# Patient Record
Sex: MALE | Race: ASIAN | NOT HISPANIC OR LATINO | URBAN - METROPOLITAN AREA
[De-identification: names, ages, dates, MRNs, and addresses within clinical notes are randomized per-mention and may not be internally consistent; named-entity substitution may affect disease eponyms.]

---

## 2017-01-05 ENCOUNTER — SURGERY (OUTPATIENT)
Dept: URBAN - METROPOLITAN AREA SURGERY 5 | Facility: SURGERY | Age: 77
End: 2017-01-05
Payer: MEDICARE

## 2017-01-05 PROCEDURE — 66821 AFTER CATARACT LASER SURGERY: CPT | Performed by: OPHTHALMOLOGY

## 2017-03-14 ENCOUNTER — FOLLOW UP ESTABLISHED (OUTPATIENT)
Dept: URBAN - METROPOLITAN AREA CLINIC 10 | Facility: CLINIC | Age: 77
End: 2017-03-14
Payer: MEDICARE

## 2017-03-14 PROCEDURE — 99212 OFFICE O/P EST SF 10 MIN: CPT | Performed by: OPTOMETRIST

## 2017-03-14 PROCEDURE — 92134 CPTRZ OPH DX IMG PST SGM RTA: CPT | Performed by: OPTOMETRIST

## 2017-03-14 ASSESSMENT — VISUAL ACUITY
OS: 20/30
OD: 20/40

## 2018-07-03 ENCOUNTER — FOLLOW UP ESTABLISHED (OUTPATIENT)
Dept: URBAN - METROPOLITAN AREA CLINIC 30 | Facility: CLINIC | Age: 78
End: 2018-07-03
Payer: COMMERCIAL

## 2018-07-03 DIAGNOSIS — H02.052 TRICHIASIS WITHOUT ENTROPION, RIGHT LOWER LID: ICD-10-CM

## 2018-07-03 PROCEDURE — 92015 DETERMINE REFRACTIVE STATE: CPT | Performed by: OPTOMETRIST

## 2018-07-03 PROCEDURE — 92134 CPTRZ OPH DX IMG PST SGM RTA: CPT | Performed by: OPTOMETRIST

## 2018-07-03 PROCEDURE — 92014 COMPRE OPH EXAM EST PT 1/>: CPT | Performed by: OPTOMETRIST

## 2018-07-03 ASSESSMENT — VISUAL ACUITY
OD: 20/30
OS: 20/30

## 2018-07-03 ASSESSMENT — INTRAOCULAR PRESSURE
OS: 14
OD: 14

## 2018-07-03 ASSESSMENT — KERATOMETRY
OD: 43.89
OS: 43.91

## 2020-01-20 ENCOUNTER — FOLLOW UP ESTABLISHED (OUTPATIENT)
Dept: URBAN - METROPOLITAN AREA CLINIC 10 | Facility: CLINIC | Age: 80
End: 2020-01-20
Payer: COMMERCIAL

## 2020-01-20 DIAGNOSIS — H02.831 DERMATOCHALASIS OF RIGHT UPPER LID: ICD-10-CM

## 2020-01-20 DIAGNOSIS — H02.834 DERMATOCHALASIS OF LEFT UPPER LID: ICD-10-CM

## 2020-01-20 DIAGNOSIS — H43.21 CRYSTALLINE DEPOSITS IN VITREOUS BODY, RIGHT EYE: ICD-10-CM

## 2020-01-20 DIAGNOSIS — H26.491 OTHER SECONDARY CATARACT, RIGHT EYE: Primary | ICD-10-CM

## 2020-01-20 PROCEDURE — 92014 COMPRE OPH EXAM EST PT 1/>: CPT | Performed by: OPTOMETRIST

## 2020-01-20 PROCEDURE — 92134 CPTRZ OPH DX IMG PST SGM RTA: CPT | Performed by: OPTOMETRIST

## 2020-01-20 ASSESSMENT — VISUAL ACUITY
OS: 20/25
OD: 20/20

## 2020-01-20 ASSESSMENT — INTRAOCULAR PRESSURE
OS: 13
OD: 14

## 2021-03-09 ENCOUNTER — FOLLOW UP ESTABLISHED (OUTPATIENT)
Dept: URBAN - METROPOLITAN AREA CLINIC 30 | Facility: CLINIC | Age: 81
End: 2021-03-09
Payer: COMMERCIAL

## 2021-03-09 DIAGNOSIS — H05.112 GRANULOMA OF LEFT ORBIT: ICD-10-CM

## 2021-03-09 DIAGNOSIS — H00.021 HORDEOLUM INTERNUM (MEIBOMIAN GLAND DYSFUNCTION), RIGHT UPPER LID: ICD-10-CM

## 2021-03-09 DIAGNOSIS — H35.362 DRUSEN (DEGENERATIVE) OF MACULA, LEFT EYE: ICD-10-CM

## 2021-03-09 DIAGNOSIS — H04.123 TEAR FILM INSUFFICIENCY OF BILATERAL LACRIMAL GLANDS: Primary | ICD-10-CM

## 2021-03-09 DIAGNOSIS — H43.813 VITREOUS DEGENERATION, BILATERAL: ICD-10-CM

## 2021-03-09 PROCEDURE — 92134 CPTRZ OPH DX IMG PST SGM RTA: CPT | Performed by: OPTOMETRIST

## 2021-03-09 PROCEDURE — 92014 COMPRE OPH EXAM EST PT 1/>: CPT | Performed by: OPTOMETRIST

## 2021-03-09 ASSESSMENT — KERATOMETRY
OD: 43.95
OS: 43.90

## 2021-03-09 ASSESSMENT — INTRAOCULAR PRESSURE
OD: 14
OS: 14

## 2021-03-09 ASSESSMENT — VISUAL ACUITY
OS: 20/25
OD: 20/25

## 2022-05-12 ENCOUNTER — OFFICE VISIT (OUTPATIENT)
Dept: URBAN - METROPOLITAN AREA CLINIC 10 | Facility: CLINIC | Age: 82
End: 2022-05-12
Payer: COMMERCIAL

## 2022-05-12 DIAGNOSIS — H10.45 OTHER CHRONIC ALLERGIC CONJUNCTIVITIS: ICD-10-CM

## 2022-05-12 DIAGNOSIS — H05.112 GRANULOMA OF LEFT ORBIT: ICD-10-CM

## 2022-05-12 DIAGNOSIS — H43.21 CRYSTALLINE DEPOSITS IN VITREOUS BODY, RIGHT EYE: ICD-10-CM

## 2022-05-12 DIAGNOSIS — H26.491 OTHER SECONDARY CATARACT, RIGHT EYE: Primary | ICD-10-CM

## 2022-05-12 DIAGNOSIS — H02.423 KERATOCONJUNCTIVITIS SICCA, BILATERAL: ICD-10-CM

## 2022-05-12 DIAGNOSIS — H35.362 DRUSEN (DEGENERATIVE) OF MACULA, LEFT EYE: ICD-10-CM

## 2022-05-12 PROCEDURE — 99214 OFFICE O/P EST MOD 30 MIN: CPT | Performed by: STUDENT IN AN ORGANIZED HEALTH CARE EDUCATION/TRAINING PROGRAM

## 2022-05-12 PROCEDURE — 92134 CPTRZ OPH DX IMG PST SGM RTA: CPT | Performed by: STUDENT IN AN ORGANIZED HEALTH CARE EDUCATION/TRAINING PROGRAM

## 2022-05-12 ASSESSMENT — VISUAL ACUITY
OD: 20/25
OS: 20/25

## 2022-05-12 ASSESSMENT — KERATOMETRY
OD: 44.00
OS: 44.00

## 2022-05-12 ASSESSMENT — INTRAOCULAR PRESSURE
OD: 14
OS: 10

## 2022-05-12 NOTE — IMPRESSION/PLAN
Impression: Crystalline deposits in vitreous body, right eye Plan: No change. This may limit BCVA mildly.

## 2022-05-12 NOTE — IMPRESSION/PLAN
Impression: Other secondary cataract, right eye: H26.491. Plan: Opacified capsule with option for YAG. Discussed procedure, risks, benefits and side effects. Patient requests YAG laser capsulotomy OD. Referred to ophthalmology.

## 2022-05-12 NOTE — IMPRESSION/PLAN
Impression: Granuloma of left orbit Plan: Herniated orbital fat bilateral. Pt ed of condition. Observation at this time. Pt may have remove if herniation worsens.

## 2022-05-12 NOTE — IMPRESSION/PLAN
Impression: Drusen (degenerative) of macula, left eye Plan: Mild. Cont to monitor. Not AREDS 2 candidate.

## 2022-05-12 NOTE — IMPRESSION/PLAN
Impression: Keratoconjunctivitis sicca, bilateral: H02.423. Plan: Patient educated on signs and symptoms of dry eye and importance of environmental factors. Discussed using OTC AT's with patient QID OU long-term, Gel drop QHS OU, and  warm compresses BID 5-10min OU long-term. Educated patient if signs or symptoms worsen to RTC for dry eye work-up.

## 2022-05-12 NOTE — IMPRESSION/PLAN
Impression: Other chronic allergic conjunctivitis: H10.45. Plan: Pataday BID OU prn Cool compresses BID OU x 2 weeks

see dry eye plan

## 2023-02-20 ENCOUNTER — OFFICE VISIT (OUTPATIENT)
Dept: URBAN - METROPOLITAN AREA CLINIC 30 | Facility: CLINIC | Age: 83
End: 2023-02-20
Payer: COMMERCIAL

## 2023-02-20 DIAGNOSIS — H02.831 DERMATOCHALASIS OF RIGHT UPPER LID: ICD-10-CM

## 2023-02-20 DIAGNOSIS — H02.423 KERATOCONJUNCTIVITIS SICCA, BILATERAL: ICD-10-CM

## 2023-02-20 DIAGNOSIS — H43.813 VITREOUS DEGENERATION, BILATERAL: ICD-10-CM

## 2023-02-20 DIAGNOSIS — H10.45 OTHER CHRONIC ALLERGIC CONJUNCTIVITIS: ICD-10-CM

## 2023-02-20 DIAGNOSIS — H26.491 OTHER SECONDARY CATARACT, RIGHT EYE: Primary | ICD-10-CM

## 2023-02-20 DIAGNOSIS — H35.362 DRUSEN (DEGENERATIVE) OF MACULA, LEFT EYE: ICD-10-CM

## 2023-02-20 DIAGNOSIS — H05.112 GRANULOMA OF LEFT ORBIT: ICD-10-CM

## 2023-02-20 DIAGNOSIS — H02.834 DERMATOCHALASIS OF LEFT UPPER LID: ICD-10-CM

## 2023-02-20 PROCEDURE — 99214 OFFICE O/P EST MOD 30 MIN: CPT | Performed by: OPTOMETRIST

## 2023-02-20 PROCEDURE — 92134 CPTRZ OPH DX IMG PST SGM RTA: CPT | Performed by: OPTOMETRIST

## 2023-02-20 ASSESSMENT — VISUAL ACUITY
OS: 20/40
OD: 20/40

## 2023-02-20 ASSESSMENT — INTRAOCULAR PRESSURE
OD: 16
OS: 13

## 2023-02-20 ASSESSMENT — KERATOMETRY
OD: 43.95
OS: 44.59

## 2023-02-20 NOTE — IMPRESSION/PLAN
Impression: Crystalline deposits in vitreous body, right eye Plan: No change. This may limit BCVA mildly. Monitor.

## 2023-02-20 NOTE — IMPRESSION/PLAN
Impression: Drusen (degenerative) of macula, left eye Plan: Mild. Cont to monitor. Not AREDS 2 candidate. See MAC OCT.

## 2023-02-20 NOTE — IMPRESSION/PLAN
Impression: Keratoconjunctivitis sicca, bilateral: H02.423. Plan: Pt notes blurry VA. Continue OTC AT's with patient QID OU long-term, Gel drop QHS OU, and  warm compresses BID 5-10min OU long-term.

## 2023-02-20 NOTE — IMPRESSION/PLAN
Impression: Granuloma of left orbit Plan: Pt's daughter is present for today's exam. Herniated orbital fat bilateral. Pt ed of condition. Pt notes recent increase in size and and states it is affecting his peripheral vision- may be more related to dermatochalsis. Will schedule oculoplastics consult. Pt may have remove if herniation worsens. Monitor. CT shows some restriction in superior gaze 2/20/23.

## 2023-02-20 NOTE — IMPRESSION/PLAN
Impression: Other secondary cataract, right eye: H26.491. Plan: Opacified capsule with option for YAG. Discussed procedure, risks, benefits and side effects. Patient requests YAG laser capsulotomy OD.

## 2023-02-20 NOTE — IMPRESSION/PLAN
Impression: Vitreous degeneration, bilateral Plan: Retina exam appears stable. Signs and symptoms of RD reviewed. RTC STAT if any increased in floaters or loss of VA. See mac oct for today's findings.

## 2023-04-18 ENCOUNTER — OFFICE VISIT (OUTPATIENT)
Dept: URBAN - METROPOLITAN AREA CLINIC 24 | Facility: CLINIC | Age: 83
End: 2023-04-18
Payer: COMMERCIAL

## 2023-04-18 DIAGNOSIS — H05.813: ICD-10-CM

## 2023-04-18 DIAGNOSIS — H02.834 DERMATOCHALASIS OF LEFT UPPER LID: ICD-10-CM

## 2023-04-18 DIAGNOSIS — H02.831 DERMATOCHALASIS OF RIGHT UPPER LID: Primary | ICD-10-CM

## 2023-04-18 PROCEDURE — 92081 LIMITED VISUAL FIELD XM: CPT | Performed by: STUDENT IN AN ORGANIZED HEALTH CARE EDUCATION/TRAINING PROGRAM

## 2023-04-18 PROCEDURE — 99204 OFFICE O/P NEW MOD 45 MIN: CPT | Performed by: STUDENT IN AN ORGANIZED HEALTH CARE EDUCATION/TRAINING PROGRAM

## 2023-04-18 PROCEDURE — 92285 EXTERNAL OCULAR PHOTOGRAPHY: CPT | Performed by: STUDENT IN AN ORGANIZED HEALTH CARE EDUCATION/TRAINING PROGRAM

## 2023-04-18 RX ORDER — NEOMYCIN SULFATE, POLYMYXIN B SULFATE AND DEXAMETHASONE 3.5; 10000; 1 MG/G; [USP'U]/G; MG/G
OINTMENT OPHTHALMIC
Qty: 2 | Refills: 0 | Status: ACTIVE
Start: 2023-04-18

## 2023-04-18 NOTE — IMPRESSION/PLAN
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 Plan: Bilateral superotemporal subconjunctival orbital fat prolapse. Significant. Causing foreign body sensation and irritation. Suspect affecting tear film distribution to some extent. Discussed findings with patient. Patient wishes to observe for now, he is not interested in surgery to remove prolapsed fat.

## 2023-04-18 NOTE — IMPRESSION/PLAN
Impression: Dermatochalasis of left upper lid: H02.834. Plan: Visual Fields & photos were performed and interpreted today and demonstrated restriction of superior and temporal visual fields. This reverted to normal, demonstrating >30% improvement in visual field with mechanical elevation of the eyelid and brow. Visual field shows restriction of superior and temporal visual fields in resting position. With eyelids/brows elevated/taped there is a significant (>30%) improvement in the superior and temporal visual fields. To correct the loss of superior and temporal VF caused by redundant upper eyelid skin and muscle, I recommended performing an upper lid blepharoplasty. Discussed the R/B/A of this procedure, all questions were answered. Skin and medial fat OU Low lid crease

## 2023-04-18 NOTE — IMPRESSION/PLAN
Impression: Dermatochalasis of right upper lid: H02.831. Plan: Visual Fields & photos were performed and interpreted today and demonstrated restriction of superior and temporal visual fields. This reverted to normal, demonstrating >30% improvement in visual field with mechanical elevation of the eyelid and brow. Visual field shows restriction of superior and temporal visual fields in resting position. With eyelids/brows elevated/taped there is a significant (>30%) improvement in the superior and temporal visual fields. To correct the loss of superior and temporal VF caused by redundant upper eyelid skin and muscle, I recommended performing an upper lid blepharoplasty. Discussed the R/B/A of this procedure, all questions were answered. Skin and medial fat OU Low lid crease

## 2024-11-18 ENCOUNTER — OFFICE VISIT (OUTPATIENT)
Dept: URBAN - METROPOLITAN AREA CLINIC 30 | Facility: CLINIC | Age: 84
End: 2024-11-18
Payer: COMMERCIAL

## 2024-11-18 DIAGNOSIS — H52.4 PRESBYOPIA: ICD-10-CM

## 2024-11-18 DIAGNOSIS — H43.21 CRYSTALLINE DEPOSITS IN VITREOUS BODY, RIGHT EYE: ICD-10-CM

## 2024-11-18 DIAGNOSIS — H10.45 OTHER CHRONIC ALLERGIC CONJUNCTIVITIS: ICD-10-CM

## 2024-11-18 DIAGNOSIS — Z96.1 PRESENCE OF INTRAOCULAR LENS: ICD-10-CM

## 2024-11-18 DIAGNOSIS — H26.491 OTHER SECONDARY CATARACT, RIGHT EYE: ICD-10-CM

## 2024-11-18 DIAGNOSIS — H02.423 KERATOCONJUNCTIVITIS SICCA, BILATERAL: ICD-10-CM

## 2024-11-18 DIAGNOSIS — H35.362 DRUSEN (DEGENERATIVE) OF MACULA, LEFT EYE: Primary | ICD-10-CM

## 2024-11-18 DIAGNOSIS — H05.112 GRANULOMA OF LEFT ORBIT: ICD-10-CM

## 2024-11-18 PROCEDURE — 99214 OFFICE O/P EST MOD 30 MIN: CPT | Performed by: OPTOMETRIST

## 2024-11-18 PROCEDURE — 92134 CPTRZ OPH DX IMG PST SGM RTA: CPT | Performed by: OPTOMETRIST

## 2024-11-18 RX ORDER — PERFLUOROHEXYLOCTANE 1 MG/MG
100 % SOLUTION OPHTHALMIC
Qty: 5 | Refills: 4 | Status: ACTIVE
Start: 2024-11-18

## 2024-11-18 ASSESSMENT — INTRAOCULAR PRESSURE
OD: 16
OS: 17

## 2024-11-18 ASSESSMENT — KERATOMETRY
OS: 45.00
OD: 44.25

## 2024-11-18 ASSESSMENT — VISUAL ACUITY
OS: 20/25
OD: 20/25